# Patient Record
Sex: MALE | Race: BLACK OR AFRICAN AMERICAN | NOT HISPANIC OR LATINO | Employment: OTHER | URBAN - METROPOLITAN AREA
[De-identification: names, ages, dates, MRNs, and addresses within clinical notes are randomized per-mention and may not be internally consistent; named-entity substitution may affect disease eponyms.]

---

## 2019-11-18 PROBLEM — C92.00 AML (ACUTE MYELOID LEUKEMIA): Status: ACTIVE | Noted: 2019-11-18

## 2019-12-30 PROBLEM — Z51.11 ENCOUNTER FOR ANTINEOPLASTIC CHEMOTHERAPY: Status: ACTIVE | Noted: 2019-12-30

## 2020-02-10 PROBLEM — K08.9 POOR DENTITION: Status: ACTIVE | Noted: 2020-02-10

## 2020-02-10 PROBLEM — E78.00 PURE HYPERCHOLESTEROLEMIA: Status: ACTIVE | Noted: 2020-02-10

## 2020-02-10 PROBLEM — Z51.11 MAINTENANCE CHEMOTHERAPY FOLLOWING DISEASE: Status: ACTIVE | Noted: 2018-01-08

## 2020-04-29 ENCOUNTER — TELEPHONE (OUTPATIENT)
Dept: ADMINISTRATIVE | Facility: CLINIC | Age: 70
End: 2020-04-29

## 2020-05-04 PROBLEM — T45.1X5A CHEMOTHERAPY-INDUCED NEUTROPENIA: Status: ACTIVE | Noted: 2020-05-04

## 2020-05-04 PROBLEM — D70.1 CHEMOTHERAPY-INDUCED NEUTROPENIA: Status: ACTIVE | Noted: 2020-05-04

## 2022-01-01 ENCOUNTER — SPECIALTY PHARMACY (OUTPATIENT)
Dept: PHARMACY | Facility: CLINIC | Age: 72
End: 2022-01-01

## 2022-08-17 PROBLEM — N47.8 EXCESSIVE FORESKIN: Status: ACTIVE | Noted: 2022-08-17

## 2022-10-24 PROBLEM — N47.1 PHIMOSIS: Status: ACTIVE | Noted: 2022-01-01

## 2022-10-24 PROBLEM — N52.9 ERECTILE DYSFUNCTION: Status: ACTIVE | Noted: 2022-01-01

## 2022-10-24 PROBLEM — R35.1 NOCTURIA: Status: ACTIVE | Noted: 2022-01-01

## 2022-10-26 PROBLEM — T45.1X5A CHEMOTHERAPY-INDUCED NEUTROPENIA: Status: RESOLVED | Noted: 2020-05-04 | Resolved: 2022-01-01

## 2022-10-26 PROBLEM — N48.9 PENILE LESION: Status: ACTIVE | Noted: 2022-04-26

## 2022-10-26 PROBLEM — D70.1 CHEMOTHERAPY-INDUCED NEUTROPENIA: Status: RESOLVED | Noted: 2020-05-04 | Resolved: 2022-01-01

## 2022-10-26 PROBLEM — Z51.11 ENCOUNTER FOR ANTINEOPLASTIC CHEMOTHERAPY: Status: RESOLVED | Noted: 2019-12-30 | Resolved: 2022-01-01

## 2022-10-28 NOTE — TELEPHONE ENCOUNTER
Pa approved for venclexta starter pack until 12/31/23 (PA- [id number]) with an estimated copay of $1021.80. Forwarding to FA to investigate copay assistance.

## 2022-11-02 NOTE — TELEPHONE ENCOUNTER
Call attempt to patient regarding Venclexta prescription we received. Calling to inform patient Venclexta has been approved through insurance with a $1021.80 copay and would like to discuss FA options. Dani funding available currently. WILL NEED HH SIZE AND ANNUAL INCOME FOR DANI APPLICATION. No answer/lvm.

## 2022-11-08 NOTE — TELEPHONE ENCOUNTER
Secured [employer] dani for Venclexta and patient will pay $0 with dani on file. Forwarding to assigned [facility] for initial consult. Dani has been added to [facility].    FOR DOCUMENTATION ONLY:  Financial Assistance for Venclexta approved from 11/8/22 to 10/8/23  Source: [employer]  BIN: [id number]  PCN: [id number]  Id: [id number]  GRP: [id number]  Dani Amount: $10,000

## 2022-11-08 NOTE — TELEPHONE ENCOUNTER
Outgoing call to patient regarding Vemclexta prescription we received. Informed patient Venclexta has been approved through insurance with a $1021.80 copay and discussed FA options. Dani funding available currently. Patient provided HH size and annual income and gave permission for [facility] to apply for dani on his behalf. [facility] will be back in touch once FA is complete. Patient voiced understanding.

## 2022-11-10 NOTE — TELEPHONE ENCOUNTER
Specialty Pharmacy - Initial Clinical Assessment    Specialty Medication Orders Linked to Encounter      Flowsheet Row Most Recent Value   Medication #1 venetoclax 10 mg Tab (Order#[id number], Rx#[id number])   Medication #2 venetoclax 50 mg Tab (Order#[id number], Rx#[id number])   Medication #3 venetoclax (VENCLEXTA) 100 mg Tab (Order#[id number], Rx#[id number])          Patient Diagnosis   C92.00 - AML (acute myeloid leukemia)    Subjective    Luis Solorio is a 71 y.o. male, who is followed by the specialty pharmacy service for management and education.    Recent Encounters       Date Type Provider Description    11/10/2022 Specialty Pharmacy Samina Vaughn PharmD Initial Clinical Assessment    10/27/2022 Specialty Pharmacy Nereida Corrigan PharmD Referral Authorization          Clinical call attempts since last clinical assessment   No call attempts found.     Current Outpatient Medications   Medication Sig    azithromycin (Z-SHYLA) 250 MG tablet     levoFLOXacin (LEVAQUIN) 500 MG tablet Take 1 tablet (500 mg total) by mouth once daily.    multivitamin/iron/folic acid (CENTRUM COMPLETE ORAL) Take 1 tablet by mouth once daily.    valACYclovir (VALTREX) 500 MG tablet Take 1 tablet (500 mg total) by mouth 2 (two) times daily.    venetoclax (VENCLEXTA) 100 mg Tab Take 1 tablet (100 mg) by mouth on days days 4-28 of 28 day cycle (Patient not taking: Reported on 11/2/2022.)    venetoclax 10 mg Tab Take one tablet (10 mg) by mouth on day 1 and two tablets (20 mg) by mouth on day 2 of 28 day cycle (Patient not taking: Reported on 11/2/2022.)    venetoclax 50 mg Tab Take 1 tablet (50 mg) by mouth on day 3 of 28 day cycle (Patient not taking: Reported on 11/2/2022.)    voriconazole (VFEND) 200 MG Tab Take 1 tablet (200 mg total) by mouth 2 (two) times daily.   Last reviewed on 11/10/2022 10:04 AM by Samina Vaughn, Shantanu    Review of patient's allergies indicates:   Allergen Reactions    Iodine Hives and Nausea And Vomiting     Shellfish containing products Hives and Rash    Dye Dermatitis and Swelling   Last reviewed on  11/10/2022 10:04 AM by Samina Vaughn    Drug Interactions    Drug interactions evaluated: yes  Clinically relevant drug interactions identified: yes   Interactions list: Venclexta and Voriconazole  Risk Rating D: Consider therapy modification    Summary: CY Inhibitors (Strong) may increase the serum concentration of Venetoclax. Severity: Major Reliability Rating Good    Patient Management: If venetoclax is used for acute myeloid leukemia (AML), a strong CY inhibitor may be used during the initiation and ramp-up phase, but the venetoclax dose should be reduced as follows: 10 mg on day 1, 20 mg on day 2, 50 mg on day 3, and 100 mg on day 4. The steady daily dose of venetoclax should be 100 mg/day when used together with a strong CY inhibitor in patients with AML.     Drug management plan: Dose appropriately reduced for AML treatment    Provided the patient with educational material regarding drug interactions: yes   Educational material comments: Dose appropriately reduced for AML treatment             Adverse Effects    Constitution: System reviewed and is negative  Skin: System reviewed and is negative  Eyes: System reviewed and is negative  Endocrine and Metabolic: System reviewed and is negative  Gastrointestinal: System reviewed and is negative  Genitourinary: System reviewed and is negative  Cardiovascular: System reviewed and is negative  Hematologic: System reviewed and is negative  Musculoskeletal: System reviewed and is negative  Neurological: System reviewed and is negative  Psychiatric: System reviewed and is negative  Respiratory: System reviewed and is negative       Assessment Questions - Documented Responses      Flowsheet Row Most Recent Value   Assessment    Medication Reconciliation completed for patient Yes   During the past 4 weeks, has patient missed any activities due to condition or  medication? No   During the past 4 weeks, did patient have any of the following urgent care visits? None   Goals of Therapy Status Discussed (new start)   Status of the patients ability to self-administer: Is Able   All education points have been covered with patient? Yes, supplemental printed education provided   Welcome packet contents reviewed and discussed with patient? Yes   Assesment completed? Yes   Plan Therapy being initiated   Do you need to open a clinical intervention (i-vent)? No   Do you want to schedule first shipment? Yes   Medication #1 Assessment Info    Patient status New medication, New to [facility]   Is this medication appropriate for the patient? Yes   Is this medication effective? Not yet started   Medication #2 Assessment Info    Patient status New medication, New to [facility]   Is this medication appropriate for the patient? Yes   Is this medication effective? Not yet started   Medication #3 Assessment Info    Patient status New medication, New to [facility]   Is this medication appropriate for the patient? Yes   Is this medication effective? Not yet started          Refill Questions - Documented Responses      Flowsheet Row Most Recent Value   Patient Availability and HIPAA Verification    Does patient want to proceed with activity? Yes   HIPAA/medical authority confirmed? Yes   Relationship to patient of person spoken to? Self   Refill Screening Questions    When does the patient need to receive the medication? 11/16/22   Refill Delivery Questions    How will the patient receive the medication? Mail   When does the patient need to receive the medication? 11/16/22   Shipping Address Home   Address in [facility] confirmed and updated if neccessary? Yes   Expected Copay ($) 0   Is the patient able to afford the medication copay? Yes   Payment Method zero copay   Days supply of Refill 28   Supplies needed? No supplies needed   Refill activity completed? Yes   Refill activity plan Refill scheduled  "  Shipment/Pickup Date: 11/14/22            Objective    He has a past medical history of AML (acute myeloid leukemia), HTN (hypertension), Hyperlipidemia, Mild intermittent asthma, uncomplicated, and OA (osteoarthritis).    Tried/failed medications: cytarabine/anthracycline, MEC, Vidaza    BP Readings from Last 4 Encounters:   11/09/22 131/60   11/09/22 (!) 119/91   11/02/22 (!) 123/56   11/02/22 (!) 129/57     Ht Readings from Last 4 Encounters:   11/09/22 5' 7" (1.702 m)   11/02/22 5' 7" (1.702 m)   11/02/22 5' 7" (1.702 m)   10/26/22 5' 5.63" (1.667 m)     Wt Readings from Last 4 Encounters:   11/09/22 80.5 kg (177 lb 9 oz)   11/02/22 79.4 kg (175 lb)   11/02/22 79.6 kg (175 lb 7 oz)   10/26/22 80.6 kg (177 lb 12.8 oz)     Recent Labs   Lab Result Units 11/09/22  0836 11/02/22  0919 10/26/22  1038 10/26/22  1038 08/29/22  0709   RBC M/uL 2.47 L 2.63 L  --  3.17 L  --    Hemoglobin g/dL 7.6 L 8.1 L  --  9.8 L  --    POC Hematocrit %PCV  --   --   --   --  33 L   Hematocrit % 23.5 L 24.8 L  --  29.0 L  --    WBC K/uL 1.66 LL 1.66 LL  --  1.51 LL  --    Gran # (ANC) K/uL 0.2 L 0.2 L   < > 0.1 L  --    Gran % % 12.0 L 12.0 L  --  5.0 L  --    Platelets K/uL 76 L 79 L  --  66 L  --    Sodium mmol/L 138 141  --  137  --    Potassium mmol/L 4.0 4.0  --  3.8  --    Chloride mmol/L 108 107  --  107  --    Glucose mg/dL 102 99  --  106  --    BUN mg/dL 14 14  --  13  --    Creatinine mg/dL 0.8 0.9  --  0.85  --    Calcium mg/dL 8.7 9.2  --  9.5  --    Total Protein g/dL 7.9 7.5  --  8.6 H  --    Albumin g/dL 3.3 L 3.2 L  --  3.7  --    Total Bilirubin mg/dL 0.3 0.5  --  0.5  --    Alkaline Phosphatase U/L 104 94  --  126  --    AST U/L 19 15  --  16  --    ALT U/L 17 11  --  23  --     < > = values in this interval not displayed.     The goals of cancer treatment include:  Achieving remission of cancer, if possible  Reducing tumor size and spread of cancer, if remission is not possible  Minimizing pain and symptoms of the " cancer  Preventing infection and other complications of treatment  Promoting adequate nutrition  Encouraging proper hydration  Improving or maintaining quality of life  Maintaining optimal therapy adherence  Minimizing and managing side effects    Goals of Therapy Status: Discussed (new start)    Assessment/Plan  Patient plans to start therapy on 11/16/22      Indication, dosage, appropriateness, effectiveness, safety and convenience of his specialty medication(s) were reviewed today.     Patient Education   Patient received education on the following:   Expectations and possible outcomes of therapy  Proper use, timely administration, and missed dose management  Duration of therapy  Side effects, including prevention, minimization, and management  Contraindications and safety precautions  New or changed medications, including prescribe and over the counter medications and supplements  Reviews recommended vaccinations, as appropriate  Storage, safe handling, and disposal    Patient to begin Venclexta therapy with decitabine on 11/16, he will bring Venclexta to appointment to help go over administration for ramp up schedule. Messaged MD to see if anti-nausea medication can be sent in to his local pharmacy if appropriate.    Tasks added this encounter   12/7/2022 - Refill Call (Auto Added)  5/2/2023 - Clinical - Follow Up Assesement (180 day)   Tasks due within next 3 months   No tasks due.     Samina Vaughn, PharmD  [facility]  [address]  [place] [address]  Phone: [phone]  Fax: [phone]

## 2022-11-16 PROBLEM — C92.02 AML (ACUTE MYELOID LEUKEMIA) IN RELAPSE: Status: ACTIVE | Noted: 2022-01-01

## 2022-12-08 NOTE — TELEPHONE ENCOUNTER
New Rx received for Venclexta. Sig change. Outgoing call to pt to  and set up refill. No answer/Lvm. Will continue to follow.

## 2022-12-15 NOTE — TELEPHONE ENCOUNTER
Specialty Pharmacy - Refill Coordination    Specialty Medication Orders Linked to Encounter      Flowsheet Row Most Recent Value   Medication #1 venetoclax (VENCLEXTA) 100 mg Tab (Order#[id number], Rx#[id number])          Refill Questions - Documented Responses      Flowsheet Row Most Recent Value   Patient Availability and HIPAA Verification    Does patient want to proceed with activity? Unable to Reach          We have had multiple attempts to the patient and have been unsuccessful to reach the patient. We will stop reaching out to the patient but in the event that the patient needs the med and contacts us, we will communicate and begin dispensing for the patient. At your next visit with the patient, please review the importance of being in contact with our specialty pharmacy as a part of our care team.    Interventions added this encounter   Open: [facility] Patient Intervention - Patient educational resources: venetoclax (VENCLEXTA) 100 mg Tab     MELISSA HERNANDEZ, PharmD  [facility]  [address] [place] [address]  Phone: [phone]  Fax: [phone]

## 2022-12-29 PROBLEM — Z79.899 MEDICATION MANAGEMENT: Status: ACTIVE | Noted: 2019-12-30

## 2022-12-29 PROBLEM — Z92.21 S/P CHEMOTHERAPY, TIME SINCE LESS THAN 4 WEEKS: Status: ACTIVE | Noted: 2022-01-01

## 2022-12-29 PROBLEM — D69.6 THROMBOCYTOPENIA: Status: ACTIVE | Noted: 2022-01-01

## 2023-01-01 ENCOUNTER — PATIENT OUTREACH (OUTPATIENT)
Dept: ADMINISTRATIVE | Facility: OTHER | Age: 73
End: 2023-01-01

## 2023-01-01 ENCOUNTER — SPECIALTY PHARMACY (OUTPATIENT)
Dept: PHARMACY | Facility: CLINIC | Age: 73
End: 2023-01-01

## 2023-01-01 ENCOUNTER — TELEPHONE (OUTPATIENT)
Dept: ADMINISTRATIVE | Facility: CLINIC | Age: 73
End: 2023-01-01

## 2023-01-01 DIAGNOSIS — C92.00 ACUTE MYELOID LEUKEMIA NOT HAVING ACHIEVED REMISSION: Primary | ICD-10-CM

## 2023-01-09 NOTE — TELEPHONE ENCOUNTER
Specialty Pharmacy - Refill Coordination    Specialty Medication Orders Linked to Encounter      Flowsheet Row Most Recent Value   Medication #1 venetoclax (VENCLEXTA) 100 mg Tab (Order#[id number], Rx#[id number])            Refill Questions - Documented Responses      Flowsheet Row Most Recent Value   Patient Availability and HIPAA Verification    Does patient want to proceed with activity? Yes   HIPAA/medical authority confirmed? Yes   Relationship to patient of person spoken to? Self   Refill Screening Questions    Changes to allergies? No   Changes to medications? No   New conditions since last clinic visit? No   Unplanned office visit, urgent care, ED, or hospital admission in the last 4 weeks? No   How does patient/caregiver feel medication is working? Good   Financial problems or insurance changes? No   How many doses of your specialty medications were missed in the last 4 weeks? 0   Would patient like to speak to a pharmacist? No   When does the patient need to receive the medication? 01/11/23   Refill Delivery Questions    How will the patient receive the medication? Mail   When does the patient need to receive the medication? 01/11/23   Shipping Address Home   Address in [facility] confirmed and updated if neccessary? Yes   Expected Copay ($) 0   Is the patient able to afford the medication copay? Yes   Payment Method zero copay   Days supply of Refill 28   Supplies needed? No supplies needed   Refill activity completed? Yes   Refill activity plan Refill scheduled   Shipment/Pickup Date: 01/09/23            Current Outpatient Medications   Medication Sig    allopurinoL (ZYLOPRIM) 300 MG tablet Take 1 tablet (300 mg total) by mouth once daily.    levoFLOXacin (LEVAQUIN) 500 MG tablet Take 1 tablet (500 mg total) by mouth once daily.    multivit-min36/iron/folic acid (GERITOL COMPLETE ORAL) Take 1 tablet by mouth once daily.    ondansetron (ZOFRAN) 4 MG tablet Take 1 tablet (4 mg total) by mouth daily  as needed for Nausea. (Patient not taking: Reported on 12/28/2022)    valACYclovir (VALTREX) 500 MG tablet Take 1 tablet (500 mg total) by mouth 2 (two) times daily.    venetoclax (VENCLEXTA) 100 mg Tab Take 1 tablet by mouth on days 1-14 every 28 day cycle.    voriconazole (VFEND) 200 MG Tab Take 100 mg by mouth once daily.   Last reviewed on 1/4/2023  7:27 AM by Niya Limon RN    Review of patient's allergies indicates:   Allergen Reactions    Iodine Hives and Nausea And Vomiting    Shellfish containing products Hives and Rash    Dye Dermatitis and Swelling    Last reviewed on  1/4/2023 7:43 AM by Niya Limon      Tasks added this encounter   2/1/2023 - Refill Call (Auto Added)   Tasks due within next 3 months   No tasks due.     Myla Gasca, PharmD  [facility]  [address] [facility] [address]  Phone: [phone]  Fax: [phone]

## 2023-02-01 NOTE — TELEPHONE ENCOUNTER
Mr. Solorio states he has been holding venclexta since 1/31. He is unsure when he should restart. I will open a careplan to follow up on his start date. [facility] office notified.

## 2023-02-02 PROBLEM — L03.119 CELLULITIS AND ABSCESS OF HAND: Status: ACTIVE | Noted: 2023-01-01

## 2023-02-02 PROBLEM — L02.519 CELLULITIS AND ABSCESS OF HAND: Status: ACTIVE | Noted: 2023-01-01

## 2023-02-03 PROBLEM — T45.1X5A CHEMOTHERAPY-INDUCED NEUTROPENIA: Status: ACTIVE | Noted: 2023-01-01

## 2023-02-03 PROBLEM — L03.012 CELLULITIS OF LEFT MIDDLE FINGER: Status: ACTIVE | Noted: 2023-01-01

## 2023-02-03 PROBLEM — D70.1 CHEMOTHERAPY-INDUCED NEUTROPENIA: Status: ACTIVE | Noted: 2023-01-01

## 2023-02-03 NOTE — TELEPHONE ENCOUNTER
Messaged MDO since it appears his condition has worsened. He is currently admitted for cellulitis and is covid positive. His current ANC is 0 and Plts are 26K. We will continue to follow until he is discharge and refill venclexta when appropriate to proceed with refill.     Patient's anticipated cycle start date was 2/13 but it may be delayed due to hospitalization.

## 2023-02-05 PROBLEM — E83.42 HYPOMAGNESEMIA: Status: ACTIVE | Noted: 2023-01-01

## 2023-02-05 PROBLEM — E83.39 HYPOPHOSPHATEMIA: Status: ACTIVE | Noted: 2023-01-01

## 2023-02-13 NOTE — TELEPHONE ENCOUNTER
Patient is still admitted and holding venclexta until his infection clears and he is off IV abx. Will continue to follow discharge date.

## 2023-02-20 NOTE — TELEPHONE ENCOUNTER
Mr. Solorio has been discharged with a oncology follow up date scheduled on 2/22. I will reassess for refill after his 2/22 appt.

## 2023-02-22 PROBLEM — L03.119 CELLULITIS AND ABSCESS OF HAND: Status: RESOLVED | Noted: 2023-01-01 | Resolved: 2023-01-01

## 2023-02-22 PROBLEM — E83.42 HYPOMAGNESEMIA: Status: RESOLVED | Noted: 2023-01-01 | Resolved: 2023-01-01

## 2023-02-22 PROBLEM — L03.012 CELLULITIS OF LEFT MIDDLE FINGER: Status: RESOLVED | Noted: 2023-01-01 | Resolved: 2023-01-01

## 2023-02-22 PROBLEM — E83.39 HYPOPHOSPHATEMIA: Status: RESOLVED | Noted: 2023-01-01 | Resolved: 2023-01-01

## 2023-02-22 PROBLEM — L02.519 CELLULITIS AND ABSCESS OF HAND: Status: RESOLVED | Noted: 2023-01-01 | Resolved: 2023-01-01

## 2023-02-22 NOTE — TELEPHONE ENCOUNTER
Specialty Pharmacy - Refill Coordination  Specialty Pharmacy - Clinical Intervention    [id number] confirmed patient was last prescribed levofloaxcin. Levofloxacin does not have any interactions with venclexta. The technician informed me that mr. Solorio has not picked up VFEND, so I called him to let him know. He agreed to  VFEND today.     Specialty Medication Orders Linked to Encounter      Flowsheet Row Most Recent Value   Medication #1 venetoclax (VENCLEXTA) 100 mg Tab (Order#[id number], Rx#[id number])            Refill Questions - Documented Responses      Flowsheet Row Most Recent Value   Patient Availability and HIPAA Verification    Does patient want to proceed with activity? Yes   HIPAA/medical authority confirmed? Yes   Relationship to patient of person spoken to? Self   Refill Screening Questions    Changes to allergies? No   Changes to medications? Yes  [Started an abx but unsure of the name.]   New conditions since last clinic visit? No   Unplanned office visit, urgent care, ED, or hospital admission in the last 4 weeks? Yes  [ER visit for cellulitis]   How does patient/caregiver feel medication is working? Good   Financial problems or insurance changes? No   How many doses of your specialty medications were missed in the last 4 weeks? 0   Would patient like to speak to a pharmacist? No   When does the patient need to receive the medication? 02/27/23  [Next cycle starts on 2/27]   Refill Delivery Questions    How will the patient receive the medication? Mail   When does the patient need to receive the medication? 02/27/23  [Next cycle starts on 2/27]   Shipping Address Home   Address in [facility] confirmed and updated if neccessary? Yes   Expected Copay ($) 0   Is the patient able to afford the medication copay? Yes   Payment Method zero copay   Days supply of Refill 28   Supplies needed? No supplies needed   Refill activity completed? Yes   Refill activity plan Refill scheduled    Shipment/Pickup Date: 02/23/23            Current Outpatient Medications   Medication Sig    allopurinoL (ZYLOPRIM) 300 MG tablet Take 1 tablet (300 mg total) by mouth once daily.    levoFLOXacin (LEVAQUIN) 500 MG tablet Take 1 tablet (500 mg total) by mouth once daily.    multivit-min36/iron/folic acid (GERITOL COMPLETE ORAL) Take 1 tablet by mouth once daily.    valACYclovir (VALTREX) 500 MG tablet Take 1 tablet (500 mg total) by mouth 2 (two) times daily.    venetoclax (VENCLEXTA) 100 mg Tab Take 1 tablet by mouth on days 1-14 every 28 day cycle.    voriconazole (VFEND) 200 MG Tab Take 1 tablet (200 mg total) by mouth 2 (two) times daily.   Last reviewed on 2/22/2023  2:37 PM by Niya Limon RN    Review of patient's allergies indicates:   Allergen Reactions    Iodine Hives and Nausea And Vomiting    Shellfish containing products Hives and Rash    Dye Dermatitis and Swelling    Last reviewed on  2/22/2023 2:34 PM by Niya Limon    Interventions added this encounter   Open: [facility] Care Plan: venetoclax (VENCLEXTA) 100 mg Tab     Tasks added this encounter   No tasks added.   Tasks due within next 3 months   5/2/2023 - Clinical - Follow Up Assesement (180 day)  2/23/2023 - Refill Call (Auto Added)     MELISSA HERNANDEZ, PharmD  [facility]  [address] [place] [address]  Phone: [phone]  Fax: [phone]

## 2023-03-20 PROBLEM — D61.818 PANCYTOPENIA: Status: ACTIVE | Noted: 2022-01-01

## 2023-03-20 NOTE — TELEPHONE ENCOUNTER
Pt currently at oncologist office. Cycle was delayed due to hospital admission 2/2 finger infection. Infection now resolved and antibiotics complete. Pt believed he took last Venclexta on Saturday 3/18. Will follow up on chart notes and with pt to set up shipment later this week.

## 2023-03-22 NOTE — TELEPHONE ENCOUNTER
Per chart notes projected start day 4/24 for C5. Due to not being able to break venclexta bottles will need to get authorization to fill 28 tabs for 56 days. PA submitted.    Pending out refill appropriately. LVM to confirm with pt.

## 2023-03-27 PROBLEM — D75.9 CYTOPENIA: Status: ACTIVE | Noted: 2023-01-01

## 2023-03-27 PROBLEM — D63.0 ANEMIA IN NEOPLASTIC DISEASE: Status: ACTIVE | Noted: 2023-01-01

## 2023-04-17 NOTE — TELEPHONE ENCOUNTER
Specialty Pharmacy - Refill Coordination    Specialty Medication Orders Linked to Encounter      Flowsheet Row Most Recent Value   Medication #1 venetoclax (VENCLEXTA) 100 mg Tab (Order#[id number], Rx#[id number])          Per provider notes pt to have another week of recovery before starting. Pt reports he is starting cycle next week. Counseled pt that we are sending a 56 day supply.    Refill Questions - Documented Responses      Flowsheet Row Most Recent Value   Patient Availability and HIPAA Verification    Does patient want to proceed with activity? Yes   HIPAA/medical authority confirmed? Yes   Relationship to patient of person spoken to? Self   Refill Screening Questions    Changes to allergies? No   Changes to medications? No   New conditions since last clinic visit? No   Unplanned office visit, urgent care, ED, or hospital admission in the last 4 weeks? No   How does patient/caregiver feel medication is working? Good   Financial problems or insurance changes? No   How many doses of your specialty medications were missed in the last 4 weeks? 0   Would patient like to speak to a pharmacist? No   When does the patient need to receive the medication? 04/24/23   Refill Delivery Questions    How will the patient receive the medication? Mail   When does the patient need to receive the medication? 04/24/23   Shipping Address Home   Address in [facility] confirmed and updated if neccessary? Yes   Expected Copay ($) 0   Is the patient able to afford the medication copay? Yes   Payment Method zero copay   Days supply of Refill 56   Supplies needed? No supplies needed   Refill activity completed? Yes   Refill activity plan Refill scheduled   Shipment/Pickup Date: 04/19/23            Current Outpatient Medications   Medication Sig    allopurinoL (ZYLOPRIM) 300 MG tablet Take 1 tablet (300 mg total) by mouth once daily.    levoFLOXacin (LEVAQUIN) 500 MG tablet Take 1 tablet (500 mg total) by mouth once daily.     magnesium oxide (MAG-OX) 400 mg (241.3 mg magnesium) tablet Take 1 tablet (400 mg total) by mouth once daily.    multivit-min36/iron/folic acid (GERITOL COMPLETE ORAL) Take 1 tablet by mouth once daily.    valACYclovir (VALTREX) 500 MG tablet Take 1 tablet (500 mg total) by mouth 2 (two) times daily.    venetoclax (VENCLEXTA) 100 mg Tab Take 1 tablet by mouth on days 1-14 every 28 day cycle. (Patient not taking: Reported on 3/15/2023.)    voriconazole (VFEND) 200 MG Tab Take 1 tablet (200 mg total) by mouth 2 (two) times daily.   Last reviewed on 4/17/2023  9:06 AM by Niya Limon RN    Review of patient's allergies indicates:   Allergen Reactions    Iodine Hives and Nausea And Vomiting    Shellfish containing products Hives and Rash    Dye Dermatitis and Swelling    Last reviewed on  4/17/2023 9:05 AM by Niya Limon      Tasks added this encounter   No tasks added.   Tasks due within next 3 months   5/2/2023 - Clinical - Follow Up Assesement (180 day)  4/17/2023 - Refill Call (Auto Added)     Nereida Corrigan, PharmD  [facility]  [address] [place] [address]  Phone: [phone]  Fax: [phone]

## 2023-05-04 NOTE — TELEPHONE ENCOUNTER
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 male, who is followed by the specialty pharmacy service for management and education of his Venclexta.  He has been on therapy with venclexta for 6 months.  I have reviewed his electronic medical record and current medication list and determined that specialty medication adjustment Is not needed at this time.    Patient has not experienced adverse events.  He Is adherent reporting 0 missed doses since last review. Of note, pt did have 8 week break bt cycle 4 and 5 due to finger wound infection. Adherence has been encouraged with the following mechanism(s): Refill calls and MD appointments.  He is meeting goals of therapy and will continue treatment.        4/17/2023 2/22/2023 1/9/2023 11/10/2022   Follow Up Review   # of missed doses 0 0 0    New Medications? No Yes No    New Conditions? No No No    New Allergies? No No No    Med Effective? Good Good Good    Missed activities?    No   Urgent Care? No Yes No    Requested Pharmacist? No No No             Therapy is appropriate to continue.    Therapy is effective: Yes -blast 12%, down from 20-30%  On scale of 1 to 10, how does patient rank quality of life? (10 - Best): Unable to Assess  Recommendations: none at this time.  Review Method: Chart Review    Tasks added this encounter   No tasks added.   Tasks due within next 3 months   5/2/2023 - Clinical Assessment (6 month recurrence)  6/9/2023 - Refill Coordination Outreach (1 time occurrence)     Nereida Corrigan, PharmD  [facility]  [address]  [facility] [address]  Phone: [phone]  Fax: [phone]

## 2023-06-09 NOTE — TELEPHONE ENCOUNTER
Pt has 8 week cycles instead of 4 weeks. Pt is sent 2 cycles of medication at a time which would be a 112 day supply. Reaching out to [facility] for updated rx with correct cycle length. Pending call to 8/7, 1 week before Cycle 7. Pt knows to call if needing medication sooner.

## 2023-08-11 NOTE — TELEPHONE ENCOUNTER
Specialty Pharmacy - Refill Coordination    Specialty Medication Orders Linked to Encounter      Flowsheet Row Most Recent Value   Medication #1 venetoclax (VENCLEXTA) 100 mg Tab (Order#[id number], Rx#[id number])          Delivery to pt on 8/17    Refill Questions - Documented Responses      Flowsheet Row Most Recent Value   Patient Availability and HIPAA Verification    Does patient want to proceed with activity? Yes   HIPAA/medical authority confirmed? Yes   Relationship to patient of person spoken to? Self   Refill Screening Questions    Changes to allergies? No   Changes to medications? No   New conditions since last clinic visit? No   Unplanned office visit, urgent care, ED, or hospital admission in the last 4 weeks? No   How does patient/caregiver feel medication is working? Good   Financial problems or insurance changes? No   How many doses of your specialty medications were missed in the last 4 weeks? 0   Would patient like to speak to a pharmacist? No   When does the patient need to receive the medication? 08/21/23   Refill Delivery Questions    How will the patient receive the medication? Mail   When does the patient need to receive the medication? 08/21/23   Shipping Address Home   Address in [facility] confirmed and updated if neccessary? Yes   Expected Copay ($) 0   Is the patient able to afford the medication copay? Yes   Payment Method zero copay   Days supply of Refill 112  [2 cycles]   Supplies needed? No supplies needed   Refill activity completed? Yes   Refill activity plan Refill scheduled   Shipment/Pickup Date: 08/16/23            Current Outpatient Medications   Medication Sig    allopurinoL (ZYLOPRIM) 300 MG tablet TAKE 1 TABLET BY MOUTH EVERY DAY    lactulose (CHRONULAC) 20 gram/30 mL Soln Take 15 mLs (10 g total) by mouth every 6 (six) hours as needed (for constipation).    levoFLOXacin (LEVAQUIN) 500 MG tablet Take 1 tablet (500 mg total) by mouth once daily.    ondansetron (ZOFRAN)  4 MG tablet Take 1 tablet (4 mg total) by mouth every 6 (six) hours as needed for Nausea.    valACYclovir (VALTREX) 500 MG tablet Take 1 tablet (500 mg total) by mouth 2 (two) times daily.    venetoclax (VENCLEXTA) 100 mg Tab Take 1 tablet by mouth on days 1-14 every 56-day cycle.   Last reviewed on 8/10/2023 10:45 AM by Niya Limon, RN    Review of patient's allergies indicates:   Allergen Reactions    Iodine Hives and Nausea And Vomiting    Shellfish containing products Hives and Rash    Dye Dermatitis and Swelling    Last reviewed on  8/10/2023 10:24 AM by Niya Limon      Tasks added this encounter   No tasks added.   Tasks due within next 3 months   10/24/2023 - Clinical Assessment (6 month recurrence)     Nereida Corrigan, PharmD  [facility]  [address] [place] [id number]  Phone: [phone]  Fax: [phone]

## 2023-08-30 PROBLEM — J18.9 PNEUMONIA: Status: ACTIVE | Noted: 2023-01-01

## 2023-08-30 PROBLEM — B34.8 RHINOVIRUS INFECTION: Status: ACTIVE | Noted: 2023-01-01

## 2023-08-30 PROBLEM — E87.1 HYPONATREMIA: Status: ACTIVE | Noted: 2023-01-01

## 2023-08-30 PROBLEM — D84.9 IMMUNOCOMPROMISED: Chronic | Status: ACTIVE | Noted: 2023-01-01

## 2023-09-01 PROBLEM — R50.9 FEVER: Status: ACTIVE | Noted: 2023-01-01

## 2023-09-08 PROBLEM — T82.598A: Status: ACTIVE | Noted: 2023-01-01

## 2023-09-14 PROBLEM — T82.598A: Status: RESOLVED | Noted: 2023-01-01 | Resolved: 2023-01-01

## 2023-09-14 PROBLEM — R50.9 FEVER: Status: RESOLVED | Noted: 2023-01-01 | Resolved: 2023-01-01

## 2023-09-24 PROBLEM — I26.09 ACUTE PULMONARY EMBOLISM WITH ACUTE COR PULMONALE: Status: ACTIVE | Noted: 2023-01-01

## 2023-09-24 PROBLEM — C92.00 ACUTE MYELOID LEUKEMIA NOT HAVING ACHIEVED REMISSION: Chronic | Status: ACTIVE | Noted: 2019-11-18

## 2023-09-24 NOTE — PROGRESS NOTES
CHW - Outreach Attempt    Community Health Worker to attempt to contact patient on: 9/24/23  1st missed initial outreach attempt call. Pt is currently admitted in the hospital.  Will follow up on CHW platform.

## 2023-09-24 NOTE — PROGRESS NOTES
CHW - Outreach Attempt    Community Health Worker to attempt to contact patient on: 9/24/23 1st missed initial outreach attempt call. Pt is currently admitted in the hospital.

## 2023-09-25 PROBLEM — C92.00 ACUTE MYELOID LEUKEMIA: Status: ACTIVE | Noted: 2023-01-01

## 2023-09-26 PROBLEM — J96.01 ACUTE HYPOXEMIC RESPIRATORY FAILURE: Status: ACTIVE | Noted: 2023-01-01

## 2023-09-26 PROBLEM — R06.00 DYSPNEA: Status: ACTIVE | Noted: 2023-01-01

## 2023-10-02 PROBLEM — D64.9 ANEMIA: Status: ACTIVE | Noted: 2023-01-01

## 2023-10-02 PROBLEM — E83.39 HYPOPHOSPHATEMIA: Status: ACTIVE | Noted: 2023-01-01

## 2023-10-04 PROBLEM — I26.99 PULMONARY EMBOLUS: Status: ACTIVE | Noted: 2023-01-01

## 2023-10-06 PROBLEM — D69.6 THROMBOCYTOPENIA: Status: ACTIVE | Noted: 2023-01-01

## 2023-10-06 PROBLEM — R94.31 QT PROLONGATION: Status: ACTIVE | Noted: 2023-01-01

## 2023-10-10 PROBLEM — R06.02 SOB (SHORTNESS OF BREATH): Status: ACTIVE | Noted: 2023-01-01

## 2023-10-10 PROBLEM — I47.20 V TACH: Status: ACTIVE | Noted: 2023-01-01
